# Patient Record
Sex: FEMALE | Race: BLACK OR AFRICAN AMERICAN | Employment: FULL TIME | URBAN - METROPOLITAN AREA
[De-identification: names, ages, dates, MRNs, and addresses within clinical notes are randomized per-mention and may not be internally consistent; named-entity substitution may affect disease eponyms.]

---

## 2023-01-23 ENCOUNTER — OFFICE VISIT (OUTPATIENT)
Dept: OBGYN CLINIC | Age: 48
End: 2023-01-23
Payer: COMMERCIAL

## 2023-01-23 VITALS
DIASTOLIC BLOOD PRESSURE: 62 MMHG | WEIGHT: 199.6 LBS | HEIGHT: 68 IN | SYSTOLIC BLOOD PRESSURE: 102 MMHG | BODY MASS INDEX: 30.25 KG/M2

## 2023-01-23 DIAGNOSIS — R35.0 FREQUENT URINATION: ICD-10-CM

## 2023-01-23 DIAGNOSIS — Z11.51 SCREENING FOR HUMAN PAPILLOMAVIRUS (HPV): ICD-10-CM

## 2023-01-23 DIAGNOSIS — Z12.72 SCREENING FOR VAGINAL CANCER: ICD-10-CM

## 2023-01-23 DIAGNOSIS — N89.8 VAGINAL ITCHING: ICD-10-CM

## 2023-01-23 DIAGNOSIS — R10.2 PELVIC PAIN: Primary | ICD-10-CM

## 2023-01-23 DIAGNOSIS — E28.319 MENOPAUSE, PREMATURE: ICD-10-CM

## 2023-01-23 PROCEDURE — 99204 OFFICE O/P NEW MOD 45 MIN: CPT | Performed by: OBSTETRICS & GYNECOLOGY

## 2023-01-23 NOTE — PROGRESS NOTES
The patient is a 52 y.o. No obstetric history on file. who is seen for lower left pelvic pain. Pain is described as dull. Pain has improved but not subsided. Also complains of vaginal itching that began 2 days ago. Denies pain with intercourse. Had a hysterectomy around 2004. Still has ovaries. Complains of frequent urination for the last several weeks. States she checked urine at work and was negative. Hgb A1C elevated to 6.0 a few months ago  HISTORY:    No obstetric history on file. No LMP recorded. Patient has had a hysterectomy. Sexual History:  sexually active   Contraception:  none  No current outpatient medications on file prior to visit. No current facility-administered medications on file prior to visit. ROS:  Feeling well. No dyspnea or chest pain on exertion. No abdominal pain, change in bowel habits, black or bloody stools. No urinary tract symptoms. GYN ROS: no breast pain or new or enlarging lumps on self exam.    PHYSICAL EXAM:  Blood pressure 102/62, height 5' 8\" (1.727 m), weight 199 lb 9.6 oz (90.5 kg). The patient appears well, alert, oriented x 3, in no distress. Abdomen soft without tenderness, guarding, mass or organomegaly. Pelvic: VULVA: normal appearing vulva with no masses, tenderness or lesions, VAGINA: normal appearing vagina with normal color and discharge, no lesions, CERVIX: surgically absent, UTERUS: surgically absent, vaginal cuff well healed, ADNEXA: normal adnexa in size, nontender and no masses. ASSESSMENT:    1. Pelvic pain     PLAN:  All questions answered  Blood tests: ,Estradiol, FSH, LH, and testosterone  Diagnosis explained in detail, including differential  Follow up in 2 weeks  Pelvic ultrasound in 2 weeks  Urine culture and sensitivity  Nuswab for ?  D/c ( none seen)

## 2023-01-24 LAB
CANCER AG125 SERPL-ACNC: 7 U/ML (ref 1.5–35)
ESTRADIOL SERPL-MCNC: 190.7 PG/ML
FSH SERPL-ACNC: 21.4 MIU/ML
LH SERPL-ACNC: 6.9 MIU/ML

## 2023-01-26 LAB
A VAGINAE DNA VAG QL NAA+PROBE: NORMAL SCORE
BACTERIA SPEC CULT: NORMAL
BACTERIA SPEC CULT: NORMAL
BVAB2 DNA VAG QL NAA+PROBE: NORMAL SCORE
C ALBICANS DNA VAG QL NAA+PROBE: NEGATIVE
C GLABRATA DNA VAG QL NAA+PROBE: NEGATIVE
C TRACH RRNA SPEC QL NAA+PROBE: NEGATIVE
CYTOLOGIST CVX/VAG CYTO: NORMAL
CYTOLOGY CVX/VAG DOC THIN PREP: NORMAL
HPV APTIMA: NEGATIVE
Lab: NORMAL
MEGA1 DNA VAG QL NAA+PROBE: NORMAL SCORE
N GONORRHOEA RRNA SPEC QL NAA+PROBE: NEGATIVE
PATH REPORT.FINAL DX SPEC: NORMAL
SERVICE CMNT-IMP: NORMAL
SPECIMEN SOURCE: NORMAL
STAT OF ADQ CVX/VAG CYTO-IMP: NORMAL
T VAGINALIS RRNA SPEC QL NAA+PROBE: NEGATIVE

## 2023-01-27 LAB
TESTOST FREE SERPL-MCNC: 0.5 PG/ML (ref 0–4.2)
TESTOST SERPL-MCNC: <3 NG/DL (ref 4–50)

## 2023-02-15 ENCOUNTER — OFFICE VISIT (OUTPATIENT)
Dept: OBGYN CLINIC | Age: 48
End: 2023-02-15

## 2023-02-15 VITALS — HEIGHT: 68 IN | BODY MASS INDEX: 30.74 KG/M2 | WEIGHT: 202.8 LBS

## 2023-02-15 DIAGNOSIS — R10.2 PELVIC PAIN: Primary | ICD-10-CM

## 2023-02-15 LAB
BILIRUBIN, URINE, POC: NEGATIVE
BLOOD URINE, POC: NEGATIVE
GLUCOSE URINE, POC: NEGATIVE
KETONES, URINE, POC: NEGATIVE
LEUKOCYTE ESTERASE, URINE, POC: NEGATIVE
NITRITE, URINE, POC: NEGATIVE
PH, URINE, POC: 6 (ref 4.6–8)
PROTEIN,URINE, POC: NEGATIVE
SPECIFIC GRAVITY, URINE, POC: 1.01 (ref 1–1.03)
URINALYSIS CLARITY, POC: CLEAR
URINALYSIS COLOR, POC: YELLOW
UROBILINOGEN, POC: NORMAL

## 2023-02-15 RX ORDER — LATANOPROST 50 UG/ML
1 SOLUTION/ DROPS OPHTHALMIC NIGHTLY
COMMUNITY

## 2023-02-15 NOTE — PROGRESS NOTES
The patient is a 52 y.o.  who is seen for gyn ultrasound performed secondary to lower left pelvic pain. Ultrasound findings:  Vaginal cuff appears within normal limits. Uterus is surgically absent. Right ovary is visualized with follicles and appears within normal limits. Left ovary is visualized and appears within normal limits. Bilateral adnexa appear within normal limits. HISTORY:    No LMP recorded. Patient has had a hysterectomy. Sexual History:  has sex with males  Contraception:  status post hysterectomy  Current Outpatient Medications on File Prior to Visit   Medication Sig Dispense Refill    latanoprost (XALATAN) 0.005 % ophthalmic solution 1 drop nightly      Doxylamine Succinate, Sleep, (UNISOM PO) Take by mouth       No current facility-administered medications on file prior to visit. ROS:  Feeling well. No dyspnea or chest pain on exertion. POS abdominal pain, but no change in bowel habits, black or bloody stools. No urinary tract symptoms. GYN ROS: no breast pain or new or enlarging lumps on self exam.    PHYSICAL EXAM:  Height 5' 8\" (1.727 m), weight 202 lb 12.8 oz (92 kg). The patient appears well, alert, oriented x 3, in no distress. ASSESSMENT:    1. Pelvic pain  -     AMB POC URINALYSIS DIP STICK MANUAL W/O MICRO  -     AMB POC US, TRANSVAGINAL     PLAN:  All questions answered, discussed labs; she is not in menopause. However , testosterone was very low and she c/o low energy and libido, interested in adding testosterone. Will send Rx for test troches 0.5 mg q day  Diagnosis explained in detail, including differential. Discussed pelvic adhesions unlikely source of pain, but would offer laparoscopy as last resort. Follow-up as needed here but may want to see a back specialist for lower back pain. Pelvic ultrasound was reviewed, no pathology seen.

## 2024-01-31 ENCOUNTER — APPOINTMENT (OUTPATIENT)
Dept: GENERAL RADIOLOGY | Age: 49
End: 2024-01-31
Payer: COMMERCIAL

## 2024-01-31 ENCOUNTER — HOSPITAL ENCOUNTER (EMERGENCY)
Age: 49
Discharge: HOME | End: 2024-01-31
Payer: COMMERCIAL

## 2024-01-31 VITALS
HEIGHT: 68 IN | WEIGHT: 190 LBS | BODY MASS INDEX: 28.79 KG/M2 | TEMPERATURE: 98.3 F | OXYGEN SATURATION: 99 % | DIASTOLIC BLOOD PRESSURE: 79 MMHG | RESPIRATION RATE: 15 BRPM | SYSTOLIC BLOOD PRESSURE: 131 MMHG | HEART RATE: 61 BPM

## 2024-01-31 DIAGNOSIS — M19.071 OSTEOARTHRITIS OF RIGHT FOOT, UNSPECIFIED OSTEOARTHRITIS TYPE: ICD-10-CM

## 2024-01-31 DIAGNOSIS — M79.671 RIGHT FOOT PAIN: Primary | ICD-10-CM

## 2024-01-31 PROCEDURE — 73630 X-RAY EXAM OF FOOT: CPT

## 2024-01-31 PROCEDURE — 6370000000 HC RX 637 (ALT 250 FOR IP)

## 2024-01-31 PROCEDURE — 99283 EMERGENCY DEPT VISIT LOW MDM: CPT

## 2024-01-31 RX ORDER — KETOROLAC TROMETHAMINE 10 MG/1
10 TABLET, FILM COATED ORAL ONCE
Status: DISCONTINUED | OUTPATIENT
Start: 2024-01-31 | End: 2024-01-31

## 2024-01-31 RX ORDER — IBUPROFEN 400 MG/1
400 TABLET ORAL
Status: COMPLETED | OUTPATIENT
Start: 2024-01-31 | End: 2024-01-31

## 2024-01-31 RX ADMIN — IBUPROFEN 400 MG: 400 TABLET, FILM COATED ORAL at 09:18

## 2024-01-31 ASSESSMENT — PAIN - FUNCTIONAL ASSESSMENT: PAIN_FUNCTIONAL_ASSESSMENT: 0-10

## 2024-01-31 ASSESSMENT — PAIN DESCRIPTION - LOCATION: LOCATION: FOOT

## 2024-01-31 ASSESSMENT — PAIN SCALES - GENERAL: PAINLEVEL_OUTOF10: 8

## 2024-01-31 NOTE — ED NOTES
I have reviewed discharge instructions with the patient.  The patient verbalized understanding.    Patient left ED via Discharge Method: ambulatory to Home with self.    Opportunity for questions and clarification provided.       Patient given 1 scripts.         To continue your aftercare when you leave the hospital, you may receive an automated call from our care team to check in on how you are doing.  This is a free service and part of our promise to provide the best care and service to meet your aftercare needs.” If you have questions, or wish to unsubscribe from this service please call [phone].  Thank you for Choosing our [facility] Emergency Department.

## 2024-01-31 NOTE — ED TRIAGE NOTES
Pt reports right foot pain for few weeks. Started across bottom of foot. Over last week pain coming across mid foot and had \"bump\" pt reports used warm compresses and bump resolved. Pt reports pain worse and having numbness to toes, pain in calf. Denies discoloration. Denies truama/injury.

## 2024-01-31 NOTE — ED PROVIDER NOTES
Emergency Department Provider Note       PCP: Lois Stock MD   Age: 48 y.o.   Sex: female     DISPOSITION Decision To Discharge 01/31/2024 10:05:24 AM       ICD-10-CM    1. Right foot pain  M79.671       2. Osteoarthritis of right foot, unspecified osteoarthritis type  M19.071           Medical Decision Making     Complexity of Problems Addressed:  Complexity of Problem: 1 acute, uncomplicated illness or injury.    Data Reviewed and Analyzed:  I independently ordered and reviewed each unique test.         I interpreted the X-rays.  No acute    Discussion of management or test interpretation.  In summary this is a 48-year-old female who presented with concerns of right foot pain past medical history of plantar fasciitis.  Pain originating in the instep of her right foot radiating up to her midfoot and below her toes.  X-ray imaging is negative for acute osseous abnormality.  She does have evidence of osteoarthritis in her interphalangeal joints and midfoot which is consistent with the location of her symptomatology.  Provided outpatient course of Voltaren for symptomatic relief.  Advised continued conservative symptomatic management and discussed return precautions.  She stable for discharge.       Risk of Complications and/or Morbidity of Patient Management:  OTC drug management performed and Shared medical decision making was utilized in creating the patients health plan today.        History      48-year-old female with past medical history of plantar fasciitis presenting to emergency department with chief complaint of right foot pain that is intermittent over many years.  Pain is improved with wearing insoles in her shoes but has had a flareup of pain as of 2 weeks ago with the pain originating in her instep of her right foot across her entire heel and radiating across her dorsum of her midfoot.  Has been attempting ibuprofen at home for pain relief with minimal relief in symptoms.  Denies any recent

## 2024-01-31 NOTE — DISCHARGE INSTRUCTIONS
There were no broken bones seen on your foot x-ray today.  X-ray does show that you have arthritis in your midfoot and in your toe areas likely contributing to your pain symptoms.  Apply Voltaren to the areas where you are having pain in your foot.  Return as needed or if your symptoms worsen.  Follow-up with your primary care provider as scheduled.